# Patient Record
Sex: FEMALE | Race: WHITE | NOT HISPANIC OR LATINO | URBAN - METROPOLITAN AREA
[De-identification: names, ages, dates, MRNs, and addresses within clinical notes are randomized per-mention and may not be internally consistent; named-entity substitution may affect disease eponyms.]

---

## 2017-03-14 ENCOUNTER — OFFICE VISIT (OUTPATIENT)
Dept: MEDICAL GROUP | Age: 57
End: 2017-03-14
Payer: COMMERCIAL

## 2017-03-14 VITALS
HEART RATE: 100 BPM | WEIGHT: 224 LBS | BODY MASS INDEX: 35.16 KG/M2 | TEMPERATURE: 98.8 F | DIASTOLIC BLOOD PRESSURE: 76 MMHG | OXYGEN SATURATION: 96 % | SYSTOLIC BLOOD PRESSURE: 124 MMHG | HEIGHT: 67 IN

## 2017-03-14 DIAGNOSIS — Z12.31 ENCOUNTER FOR SCREENING MAMMOGRAM FOR BREAST CANCER: ICD-10-CM

## 2017-03-14 DIAGNOSIS — M79.602 MUSCULOSKELETAL PAIN OF LEFT UPPER EXTREMITY: ICD-10-CM

## 2017-03-14 DIAGNOSIS — Z11.59 NEED FOR HEPATITIS C SCREENING TEST: ICD-10-CM

## 2017-03-14 DIAGNOSIS — Z00.00 PE (PHYSICAL EXAM), ANNUAL: Primary | ICD-10-CM

## 2017-03-14 DIAGNOSIS — Z80.0 FHX: COLON CANCER: ICD-10-CM

## 2017-03-14 DIAGNOSIS — Z11.59 NEED FOR HEPATITIS B SCREENING TEST: ICD-10-CM

## 2017-03-14 DIAGNOSIS — E66.9 OBESITY (BMI 30-39.9): ICD-10-CM

## 2017-03-14 PROCEDURE — 99386 PREV VISIT NEW AGE 40-64: CPT | Performed by: FAMILY MEDICINE

## 2017-03-14 RX ORDER — NAPROXEN 500 MG/1
500 TABLET ORAL 2 TIMES DAILY WITH MEALS
Qty: 28 TAB | Refills: 0 | Status: SHIPPED | OUTPATIENT
Start: 2017-03-14 | End: 2017-04-11

## 2017-03-14 RX ORDER — CYCLOBENZAPRINE HCL 10 MG
10 TABLET ORAL
Qty: 30 TAB | Refills: 0 | Status: SHIPPED | OUTPATIENT
Start: 2017-03-14 | End: 2017-04-11

## 2017-03-14 ASSESSMENT — PATIENT HEALTH QUESTIONNAIRE - PHQ9: CLINICAL INTERPRETATION OF PHQ2 SCORE: 0

## 2017-03-14 ASSESSMENT — PAIN SCALES - GENERAL: PAINLEVEL: 7=MODERATE-SEVERE PAIN

## 2017-03-14 NOTE — MR AVS SNAPSHOT
"        Luisa Cerda   3/14/2017 3:20 PM   Office Visit   MRN: [id number]    Department:  [address] [place] [address]   Dept Phone:  [phone]    Description:  Female : 1960   Provider:  Glendy Rock M.D.           Reason for Visit     Establish Care right arm pain x 2 weeks pt states that it hurts more when she is moving it       Allergies as of 3/14/2017     No Known Allergies      You were diagnosed with     PE (physical exam), annual   [168075]       Obesity (BMI 30-39.9)   [638958]       Encounter for screening mammogram for breast cancer   [6889077]       Musculoskeletal pain of left upper extremity   [4429187]         Vital Signs     Blood Pressure Pulse Temperature Height Weight Body Mass Index    124/76 mmHg 100 37.1 °C (98.8 °F) 1.702 m (5' 7\") 101.606 kg (224 lb) 35.08 kg/m2    Oxygen Saturation Smoking Status                96% Never Smoker           Basic Information     Date Of Birth Sex Preferred Language          1960 Female English        Your appointments     2017  2:20 PM   Established Patient with Glendy Rock M.D.   [address])    [address] [place] [id number] [phone]           You will be receiving a confirmation call a few days before your appointment from our automated call confirmation system.              Problem List              ICD-10-CM Priority Class Noted - Resolved    Obesity (BMI 30-39.9) E66.9   3/14/2017 - Present      Health Maintenance        Date Due Completion Dates    IMM DTaP/Tdap/Td Vaccine (1 - Tdap) 1977 ---    PAP SMEAR 1979 ---    MAMMOGRAM 1998 ---    COLONOSCOPY 2008 ---    IMM INFLUENZA (1) 2016 ---            Current Immunizations     No immunizations on file.      Below and/or attached are the medications your provider expects you to take. Review all of your home medications and newly ordered medications with your provider and/or pharmacist. Follow medication instructions as directed " by your provider and/or pharmacist. Please keep your medication list with you and share with your provider. Update the information when medications are discontinued, doses are changed, or new medications (including over-the-counter products) are added; and carry medication information at all times in the event of emergency situations     Allergies:  No Known Allergies          Medications  Valid as of: March 14, 2017 -  3:42 PM    Generic Name Brand Name Tablet Size Instructions for use    Cyclobenzaprine HCl (Tab) FLEXERIL 10 MG Take 1 Tab by mouth every bedtime.        Naproxen (Tab) NAPROSYN 500 MG Take 1 Tab by mouth 2 times a day, with meals.        .                 Medicines prescribed today were sent to:     [facility] [address] [place] [address], [place] - [address] [facility]    [address] [facility] [address]    Phone: [phone] Fax: [phone]    Open 24 Hours?: No      Medication refill instructions:       If your prescription bottle indicates you have medication refills left, it is not necessary to call your provider’s office. Please contact your pharmacy and they will refill your medication.    If your prescription bottle indicates you do not have any refills left, you may request refills at any time through one of the following ways: The online [employer] system (except Urgent Care), by calling your provider’s office, or by asking your pharmacy to contact your provider’s office with a refill request. Medication refills are processed only during regular business hours and may not be available until the next business day. Your provider may request additional information or to have a follow-up visit with you prior to refilling your medication.   *Please Note: Medication refills are assigned a new Rx number when refilled electronically. Your pharmacy may indicate that no refills were authorized even though a new prescription for the same medication is available at the pharmacy. Please request the  medicine by name with the pharmacy before contacting your provider for a refill.        Your To Do List     Future Labs/Procedures Complete By Expires    COMP METABOLIC PANEL  As directed 3/15/2018    HEMOGLOBIN A1C  As directed 3/15/2018    HEP B CORE AB TOTAL  As directed 3/14/2018    HEP B SURFACE ANTIGEN  As directed 3/14/2018    HEP C VIRUS ANTIBODY  As directed 3/15/2018    LIPID PROFILE  As directed 3/15/2018    TSH WITH REFLEX TO FT4  As directed 3/14/2018    VITAMIN D,25 HYDROXY  As directed 3/15/2018      Referral     A referral request has been sent to our patient care coordination department. Please allow 3-5 business days for us to process this request and contact you either by phone or mail. If you do not hear from us by the 5th business day, please call us at [phone].           [employer] Access Code: [email or web address]  Expires: 4/7/2017  9:16 AM    [employer]  A secure, online tool to manage your health information     [employer]’s [employer]® is a secure, online tool that connects you to your personalized health information from the privacy of your home -- day or night - making it very easy for you to manage your healthcare. Once the activation process is completed, you can even access your medical information using the [employer] armani, which is available for free in the [employer] store or Google Play store.     [employer] provides the following levels of access (as shown below):   My Chart Features   [facility] Doctor [facility]  Specialists [facility]  Urgent  Care [facility]  Primary Care  Doctor   Email your healthcare team securely and privately 24/7 X X X    Manage appointments: schedule your next appointment; view details of past/upcoming appointments X      Request prescription refills. X      View recent personal medical records, including lab and immunizations X X X X   View health record, including health history, allergies, medications X X X X   Read reports about your outpatient visits,  procedures, consult and ER notes X X X X   See your discharge summary, which is a recap of your hospital and/or ER visit that includes your diagnosis, lab results, and care plan. X X       How to register for [employer]:  1. Go to  [email or web address].  2. Click on the Sign Up Now box, which takes you to the New Member Sign Up page. You will need to provide the following information:  a. Enter your [employer] Access Code exactly as it appears at the top of this page. (You will not need to use this code after you’ve completed the sign-up process. If you do not sign up before the expiration date, you must request a new code.)   b. Enter your date of birth.   c. Enter your home email address.   d. Click Submit, and follow the next screen’s instructions.  3. Create a TakeCharget ID. This will be your Take[employer] login ID and cannot be changed, so think of one that is secure and easy to remember.  4. Create a [employer] password. You can change your password at any time.  5. Enter your Password Reset Question and Answer. This can be used at a later time if you forget your password.   6. Enter your e-mail address. This allows you to receive e-mail notifications when new information is available in [employer].  7. Click Sign Up. You can now view your health information.    For assistance activating your [employer] account, call [phone]

## 2017-03-14 NOTE — Clinical Note
[facility]  Glendy Rock M.D.  [address] [place] [address]  Fax: [phone]   Authorization for Release/Disclosure of   Protected Health Information   Name: BRITTANY TERRY : 1960 SSN: [id number]   Address: [address] [place] [address]  [place] [address] Phone:    [phone] (home)    I authorize the entity listed below to release/disclose the PHI below to:   [facility]/Glendy Rock M.D. and Glendy Rock M.D.   Provider or Entity Name:     Address   City, State, [facility]   Phone:      Fax:     Reason for request: continuity of care   Information to be released:    [  ] LAST COLONOSCOPY,  including any PATH REPORT and follow-up  [  ] LAST FIT/COLOGUARD RESULT [  ] LAST DEXA  [  ] LAST MAMMOGRAM  [  ] LAST PAP  [  ] LAST LABS [  ] RETINA EXAM REPORT  [  ] IMMUNIZATION RECORDS  [  ] Release all info      [  ] Check here and initial the line next to each item to release ALL health information INCLUDING  _____ Care and treatment for drug and / or alcohol abuse  _____ HIV testing, infection status, or AIDS  _____ Genetic Testing    DATES OF SERVICE OR TIME PERIOD TO BE DISCLOSED: _____________  I understand and acknowledge that:  * This Authorization may be revoked at any time by you in writing, except if your health information has already been used or disclosed.  * Your health information that will be used or disclosed as a result of you signing this authorization could be re-disclosed by the recipient. If this occurs, your re-disclosed health information may no longer be protected by State or Federal laws.  * You may refuse to sign this Authorization. Your refusal will not affect your ability to obtain treatment.  * This Authorization becomes effective upon signing and will  on (date) __________.      If no date is indicated, this Authorization will  one (1) year from the signature date.    Name: Brittany Terry    Signature:   Date:     3/14/2017       PLEASE FAX REQUESTED RECORDS BACK TO: [phone]

## 2017-03-14 NOTE — PROGRESS NOTES
CC: establish care    HPI:     Luisa Cerda is a 56 y.o. female, new patient to the clinic, presents to establish care. Pt has the following concerns:     1. Obesity (BMI 30-39.9)  BMI 35.08, endorses sedentary lifestyle and no dietary modification.   Patient is interested in [employer] improvement program.    2. Musculoskeletal pain of left upper extremity  Pt develops acute right lateral arm pain couple days ago. She states that she was busy with relocation from [place] to [place] recently. She might have injury her right arm, but she does not remember how. She has two bruises at the left lateral arm. Pain is described as sharp, constant, non-radiating, worse with right arm abduction, lifting heavy objects. Denies problems with right shoulder. Pt has been applying heat, but symptoms fail to improve.    3. FHx: colon cancer  Mom, maternal uncle, and maternal grandmother was diagnosed with colon cancer in their 40s.   Patient herself had several colonoscopy in the past. Last colonoscopy was 2 years ago.  All were normal per patient report. It is recommended that she has repeat colonoscopy every 3 years.   Denies weight loss, hematochezia, melena, decreased stool caliber, abdominal pain, nausea, vomiting.    4. Need for hepatitis C, B screening test  Pt's   suddenly from liver failure and bone cancer last year. She was told that her  had liver failure, but does not know if he has liver cancer. Pt's  did not have hx of alcohol abuse. She does not know if he had hx of hepatitis. Pt wants to be screened for liver disease and hepatitis.       Current medicines (including changes today)  Current Outpatient Prescriptions   Medication Sig Dispense Refill   • naproxen (NAPROSYN) 500 MG Tab Take 1 Tab by mouth 2 times a day, with meals. 28 Tab 0   • cyclobenzaprine (FLEXERIL) 10 MG Tab Take 1 Tab by mouth every bedtime. 30 Tab 0     No current facility-administered medications for this visit.  "    She  has no past medical history on file.  She  has no past surgical history on file.  Social History   Substance Use Topics   • Smoking status: Never Smoker    • Smokeless tobacco: Never Used   • Alcohol Use: No     Social History     Social History Narrative   • No narrative on file     Family History   Problem Relation Age of Onset   • Hyperlipidemia Mother    • Cancer Father 40     colon cancer   • Cancer Paternal Grandmother 40     colon cancer   • Cancer Paternal Uncle 40     colon cancer     Family Status   Relation Status Death Age   • Mother  72     kidney failure   • Father Alive    • Sister Alive    • Brother Alive    • Maternal Grandmother     • Maternal Grandfather     • Paternal Grandmother     • Paternal Grandfather     • Brother Alive        I personally reviewed patient's problem list, allergies, medications, family hx, social hx with patient and update EPIC.     REVIEW OF SYSTEMS:  CONSTITUTIONAL:  Denies night sweats, fatigue, malaise, lethargy, fever or chills.  RESPIRATORY:  Denies cough, wheeze, hemoptysis, or shortness of breath.  CARDIOVASCULAR:  Denies chest pains, palpitations, pedal edema  GASTROINTESTINAL:  Denies abdominal pain, nausea or vomiting, diarrhea, constipation, hematemesis, hematochezia, melena.  GENITOURINARY:  Denies urinary urgency, frequency, dysuria, or hematuria.  No obstructive symptoms.  Denies unusual discharge.      All other systems reviewed and are negative     Objective:     Blood pressure 124/76, pulse 100, temperature 37.1 °C (98.8 °F), height 1.702 m (5' 7\"), weight 101.606 kg (224 lb), SpO2 96 %. Body mass index is 35.08 kg/(m^2).  Physical Exam:    Constitutional: Awake, alert, in no apparent distress, obese  Skin: Warm, dry, good turgor, no rashes/jaundice in visible areas.  Eye:  intact EOM, conjunctiva clear, lids normal.  Neck: Trachea midline, no masses, no thyromegaly. No cervical or supraclavicular " lymphadenopathy.  Respiratory: Unlabored respiratory effort, lungs clear to auscultation, no wheezes, no rhonchi.  Cardiovascular: Normal S1, S2, no murmur, no rubs, no gallops, no pedal edema.  Abdomen: Soft, active bowel sounds, non-tender to palpation, no masses, no hepatosplenomegaly.  Neuro: CN 2-12 grossly intact, no focal weakness/numbness.   Psych: Alert and oriented x3, affect and mood wnl, intact judgement and insight.   MSK: Moderate tenderness to palpation of the right lateral arm, along deltoid distribution. There are two small SQ hematoma on this same area.   Pain is worse with resisted abduction. Shoulder exam wnl.       Assessment and Plan:   The following treatment plan was discussed    1. PE (physical exam), annual  - COMP METABOLIC PANEL; Future  - HEMOGLOBIN A1C; Future  - LIPID PROFILE; Future  - VITAMIN D,25 HYDROXY; Future  - TSH WITH REFLEX TO FT4; Future      2. Obesity (BMI 30-39.9)  - Patient identified as having weight management issue.  Appropriate orders and counseling given.  - HEMOGLOBIN A1C; Future  - LIPID PROFILE; Future  - REFERRAL TO [facility] (HIP) Services Requested:: Weight Management Program, Medicare Obesity Counseling; Reason for Visit:: Overweight/Obesity      3. Encounter for screening mammogram for breast cancer  - MA-SCREEN MAMMO W/CAD-BILAT    4. Musculoskeletal pain of left upper extremity  Hx and exam consistent with deltoid injury. Plan:   - naproxen (NAPROSYN) 500 MG Tab; Take 1 Tab by mouth 2 times a day, with meals.  Dispense: 28 Tab; Refill: 0  - cyclobenzaprine (FLEXERIL) 10 MG Tab; Take 1 Tab by mouth every bedtime.  Dispense: 30 Tab; Refill: 0  - rest, avoid activities that can exacerbate the pain.     5. FHx: colon cancer  Mom, maternal uncle, and maternal grandmother was diagnosed with colon cancer in their 40s.   Patient herself had several colonoscopy in the past. Last colonoscopy was 2 years ago.  All were normal per patient  report. It is recommended that she has repeat colonoscopy every 3 years.   Denies weight loss, hematochezia, melena, decreased stool caliber, abdominal pain, nausea, vomiting.  Plan:   - f/u colonoscopy every 3 years    6. Need for hepatitis C, B screening test  Pt's   suddenly from liver failure and bone cancer last year. She was told that her  had liver failure, but does not know if he has liver cancer. Pt's  did not have hx of alcohol abuse. She does not know if he had hx of hepatitis. Pt wants to be screened for liver disease and hepatitis.  Plan:   - HEP B CORE AB TOTAL; Future  - HEP B SURFACE ANTIGEN; Future  - HEP B SURFACE AB  - HEP C VIRUS ANTIBODY; Future      Records requested.  Followup: Return in about 4 weeks (around 2017) for Pap.

## 2017-03-17 ENCOUNTER — HOSPITAL ENCOUNTER (OUTPATIENT)
Dept: LAB | Facility: MEDICAL CENTER | Age: 57
End: 2017-03-17
Attending: FAMILY MEDICINE
Payer: COMMERCIAL

## 2017-03-17 DIAGNOSIS — E66.9 OBESITY (BMI 30-39.9): ICD-10-CM

## 2017-03-17 DIAGNOSIS — Z00.00 PE (PHYSICAL EXAM), ANNUAL: ICD-10-CM

## 2017-03-17 LAB
25(OH)D3 SERPL-MCNC: 20 NG/ML (ref 30–100)
ALBUMIN SERPL BCP-MCNC: 3.8 G/DL (ref 3.2–4.9)
ALBUMIN/GLOB SERPL: 1.4 G/DL
ALP SERPL-CCNC: 66 U/L (ref 30–99)
ALT SERPL-CCNC: 17 U/L (ref 2–50)
ANION GAP SERPL CALC-SCNC: 10 MMOL/L (ref 0–11.9)
AST SERPL-CCNC: 19 U/L (ref 12–45)
BILIRUB SERPL-MCNC: 1 MG/DL (ref 0.1–1.5)
BUN SERPL-MCNC: 21 MG/DL (ref 8–22)
CALCIUM SERPL-MCNC: 9.1 MG/DL (ref 8.5–10.5)
CHLORIDE SERPL-SCNC: 105 MMOL/L (ref 96–112)
CHOLEST SERPL-MCNC: 207 MG/DL (ref 100–199)
CO2 SERPL-SCNC: 22 MMOL/L (ref 20–33)
CREAT SERPL-MCNC: 0.74 MG/DL (ref 0.5–1.4)
EST. AVERAGE GLUCOSE BLD GHB EST-MCNC: 105 MG/DL
GLOBULIN SER CALC-MCNC: 2.8 G/DL (ref 1.9–3.5)
GLUCOSE SERPL-MCNC: 86 MG/DL (ref 65–99)
HBA1C MFR BLD: 5.3 % (ref 0–5.6)
HBV CORE AB SERPL QL IA: NEGATIVE
HBV SURFACE AB SER RIA-ACNC: 3.42 MIU/ML (ref 0–10)
HBV SURFACE AG SERPL QL IA: NEGATIVE
HCV AB S/CO SERPL IA: NEGATIVE
HDLC SERPL-MCNC: 57 MG/DL
LDLC SERPL CALC-MCNC: 136 MG/DL
POTASSIUM SERPL-SCNC: 3.5 MMOL/L (ref 3.6–5.5)
PROT SERPL-MCNC: 6.6 G/DL (ref 6–8.2)
SODIUM SERPL-SCNC: 137 MMOL/L (ref 135–145)
TRIGL SERPL-MCNC: 68 MG/DL (ref 0–149)
TSH SERPL DL<=0.005 MIU/L-ACNC: 0.83 UIU/ML (ref 0.3–3.7)

## 2017-03-17 PROCEDURE — 80061 LIPID PANEL: CPT

## 2017-03-17 PROCEDURE — 84443 ASSAY THYROID STIM HORMONE: CPT

## 2017-03-17 PROCEDURE — 83036 HEMOGLOBIN GLYCOSYLATED A1C: CPT

## 2017-03-17 PROCEDURE — 86803 HEPATITIS C AB TEST: CPT

## 2017-03-17 PROCEDURE — 86706 HEP B SURFACE ANTIBODY: CPT

## 2017-03-17 PROCEDURE — 87340 HEPATITIS B SURFACE AG IA: CPT

## 2017-03-17 PROCEDURE — 36415 COLL VENOUS BLD VENIPUNCTURE: CPT

## 2017-03-17 PROCEDURE — 80053 COMPREHEN METABOLIC PANEL: CPT

## 2017-03-17 PROCEDURE — 82306 VITAMIN D 25 HYDROXY: CPT

## 2017-03-17 PROCEDURE — 86704 HEP B CORE ANTIBODY TOTAL: CPT

## 2017-03-20 PROBLEM — E78.00 PURE HYPERCHOLESTEROLEMIA: Status: ACTIVE | Noted: 2017-03-20

## 2017-03-20 PROBLEM — E55.9 VITAMIN D INSUFFICIENCY: Status: ACTIVE | Noted: 2017-03-20

## 2017-04-07 ENCOUNTER — PATIENT MESSAGE (OUTPATIENT)
Dept: FAMILY PLANNING/WOMEN'S HEALTH CLINIC | Facility: OTHER | Age: 57
End: 2017-04-07

## 2017-04-11 ENCOUNTER — HOSPITAL ENCOUNTER (OUTPATIENT)
Facility: MEDICAL CENTER | Age: 57
End: 2017-04-11
Attending: FAMILY MEDICINE
Payer: COMMERCIAL

## 2017-04-11 ENCOUNTER — OFFICE VISIT (OUTPATIENT)
Dept: MEDICAL GROUP | Age: 57
End: 2017-04-11
Payer: COMMERCIAL

## 2017-04-11 VITALS
HEIGHT: 67 IN | SYSTOLIC BLOOD PRESSURE: 122 MMHG | TEMPERATURE: 97.6 F | BODY MASS INDEX: 35 KG/M2 | OXYGEN SATURATION: 96 % | WEIGHT: 223 LBS | HEART RATE: 110 BPM | DIASTOLIC BLOOD PRESSURE: 80 MMHG

## 2017-04-11 DIAGNOSIS — Z01.419 PAP SMEAR, LOW-RISK: ICD-10-CM

## 2017-04-11 DIAGNOSIS — Z11.51 SPECIAL SCREENING EXAMINATION FOR HUMAN PAPILLOMAVIRUS (HPV): ICD-10-CM

## 2017-04-11 DIAGNOSIS — Z01.419 PAP SMEAR, LOW-RISK: Primary | ICD-10-CM

## 2017-04-11 DIAGNOSIS — Z12.4 ROUTINE CERVICAL SMEAR: ICD-10-CM

## 2017-04-11 PROBLEM — M79.602 MUSCULOSKELETAL PAIN OF LEFT UPPER EXTREMITY: Status: RESOLVED | Noted: 2017-03-14 | Resolved: 2017-04-11

## 2017-04-11 PROCEDURE — 88175 CYTOPATH C/V AUTO FLUID REDO: CPT

## 2017-04-11 PROCEDURE — 87624 HPV HI-RISK TYP POOLED RSLT: CPT

## 2017-04-11 PROCEDURE — 99396 PREV VISIT EST AGE 40-64: CPT | Performed by: FAMILY MEDICINE

## 2017-04-11 NOTE — PROGRESS NOTES
"Subjective:   CC: well woman exam    HPI:     Luisa Cerda is a 56 y.o. female, established patient of the clinic, presents with the following concerns:     , not sexually active   Contraception: pt is postmenopausal  Hx of STD: yes, Chlamydia when she was in her 20s, successfully treated  Hx of abnormal pap: none  LMP: 15 years ago   Denies fever, chills, pelvic pain, dyspareunia, abnormal vaginal bleeding/discharge, genital rash.     Denies nipple bleeding, discharge, breast mass, familial/personal hx of breast/gyn malignancy.     Current medicines (including changes today)  No current outpatient prescriptions on file.     No current facility-administered medications for this visit.     She  has no past medical history on file.    I personally reviewed patient's problem list, allergies, medications, family hx, social hx with patient and update EPIC.     REVIEW OF SYSTEMS:  CONSTITUTIONAL:  Denies night sweats, fatigue, malaise, lethargy, fever or chills.  RESPIRATORY:  Denies cough, wheeze, hemoptysis, or shortness of breath.  CARDIOVASCULAR:  Denies chest pains, palpitations, pedal edema  GASTROINTESTINAL:  Denies abdominal pain, nausea or vomiting, diarrhea, constipation, hematemesis, hematochezia, melena.  GENITOURINARY:  Denies urinary urgency, frequency, dysuria, or hematuria.       Objective:     Blood pressure 122/80, pulse 110, temperature 36.4 °C (97.6 °F), height 1.689 m (5' 6.5\"), weight 101.152 kg (223 lb), last menstrual period 2001, SpO2 96 %. Body mass index is 35.46 kg/(m^2).    Physical Exam:  Constitutional: awake, alert, in no distress.  Skin: Warm, dry, good turgor, no rashes, bruises, ulcers in visible areas.  Eye: PERRL, conjunctiva clear, lids neg for edema or lesions.  Neck: Trachea midline, no masses, no thyromegaly. No cervical or supraclavicular lymphadenopathy  Respiratory: Unlabored respiratory effort, lungs clear to auscultation, no wheezes, no rhonchi.  Cardiovascular: " Normal S1, S2, no murmur, no pedal edema.  Abdomen: Soft, non-tender to palpation, no hernia, no hepatosplenomegaly.  Neuro: CN2-12 grossly intact.    Psych: Oriented x3, affect and mood wnl, intact judgement and insight.   GYN: Unremarkable external genitalia. Negative abnormal vaginal discharge or bleeding, no vaginal rash, cervix in mid position, no concerning lesions on cervix, no cervical motion tenderness, uterus mid position with normal size & shape, no adnexal fullness/mass appreciated on bimanual exam.    Assessment and Plan:   The following treatment plan was discussed    1. Pap smear, low-risk  - THINPREP PAP WITH HPV; Future    2. Breast cancer screen:   - mammogram ordered, advised pt to schedule appointment for mammogram.     3. Colon cancer screen:   Done 2-3 years ago with [facility], will request records.     Glendy Rock M.D.      Followup: Return in about 6 months (around 10/11/2017) for Multiple issues.    Please note that this dictation was created using voice recognition software. I have made every reasonable attempt to correct obvious errors, but I expect that there are errors of grammar and possibly content that I did not discover before finalizing the note.

## 2017-04-11 NOTE — MR AVS SNAPSHOT
"        Luisa Cerda   2017 2:20 PM   Office Visit   MRN: [id number]    Department:  [address] [place] [address]   Dept Phone:  [phone]    Description:  Female : 1960   Provider:  Glendy Rock M.D.           Reason for Visit     Gynecologic Exam PAP      Allergies as of 2017     No Known Allergies      You were diagnosed with     Pap smear, low-risk   [292442]  -  Primary     Routine cervical smear   [[id number]]       Special screening examination for human papillomavirus (HPV)   [V73.81.ICD-9-CM]         Vital Signs     Blood Pressure Pulse Temperature Height Weight Body Mass Index    122/80 mmHg 110 36.4 °C (97.6 °F) 1.689 m (5' 6.5\") 101.152 kg (223 lb) 35.46 kg/m2    Oxygen Saturation Last Menstrual Period Smoking Status             96% 2001 Never Smoker          Basic Information     Date Of Birth Sex Race Ethnicity Preferred Language    1960 Female Unable to Obtain, White Unknown English      Your appointments     Oct 12, 2017  8:00 AM   Established Patient with Glendy Rock M.D.   [address] [id number] [phone]           You will be receiving a confirmation call a few days before your appointment from our automated call confirmation system.              Problem List              ICD-10-CM Priority Class Noted - Resolved    Obesity (BMI 30-39.9) E66.9   3/14/2017 - Present    FHx: colon cancer Z80.0   3/14/2017 - Present    Vitamin D insufficiency E55.9   3/20/2017 - Present    Pure hypercholesterolemia E78.00   3/20/2017 - Present      Health Maintenance        Date Due Completion Dates    IMM DTaP/Tdap/Td Vaccine (1 - Tdap) 1977 ---    PAP SMEAR 1979 ---    MAMMOGRAM 1998 ---    COLONOSCOPY 2008 ---            Current Immunizations     No immunizations on file.      Below and/or attached are the medications your provider expects you to take. Review all of your home medications and newly ordered " medications with your provider and/or pharmacist. Follow medication instructions as directed by your provider and/or pharmacist. Please keep your medication list with you and share with your provider. Update the information when medications are discontinued, doses are changed, or new medications (including over-the-counter products) are added; and carry medication information at all times in the event of emergency situations     Allergies:  No Known Allergies          Medications  Valid as of: April 11, 2017 -  3:00 PM    Generic Name Brand Name Tablet Size Instructions for use    .                 Medicines prescribed today were sent to:     [address], [place] [address] [facility]    [address] [facility] [place] [address]    Phone: [phone] Fax: [phone]    Open 24 Hours?: No      Medication refill instructions:       If your prescription bottle indicates you have medication refills left, it is not necessary to call your provider’s office. Please contact your pharmacy and they will refill your medication.    If your prescription bottle indicates you do not have any refills left, you may request refills at any time through one of the following ways: The online [facility] system (except Urgent Care), by calling your provider’s office, or by asking your pharmacy to contact your provider’s office with a refill request. Medication refills are processed only during regular business hours and may not be available until the next business day. Your provider may request additional information or to have a follow-up visit with you prior to refilling your medication.   *Please Note: Medication refills are assigned a new Rx number when refilled electronically. Your pharmacy may indicate that no refills were authorized even though a new prescription for the same medication is available at the pharmacy. Please request the medicine by name with the pharmacy before contacting your provider for a  refill.        Your To Do List     Future Labs/Procedures Complete By Expires    THINPREP PAP WITH HPV  As directed 4/12/2018         [employer] Access Code: Activation code not generated  Current [employer] Status: Active

## 2017-08-01 ENCOUNTER — HOSPITAL ENCOUNTER (OUTPATIENT)
Dept: RADIOLOGY | Facility: MEDICAL CENTER | Age: 57
End: 2017-08-01
Attending: FAMILY MEDICINE
Payer: COMMERCIAL

## 2017-08-01 PROCEDURE — G0202 SCR MAMMO BI INCL CAD: HCPCS

## 2017-08-08 DIAGNOSIS — R92.8 ABNORMAL MAMMOGRAM: ICD-10-CM

## 2017-09-05 ENCOUNTER — HOSPITAL ENCOUNTER (OUTPATIENT)
Dept: RADIOLOGY | Facility: MEDICAL CENTER | Age: 57
End: 2017-09-05
Attending: FAMILY MEDICINE
Payer: COMMERCIAL

## 2017-09-05 DIAGNOSIS — R92.8 ABNORMAL MAMMOGRAM: ICD-10-CM

## 2017-09-05 PROCEDURE — G0206 DX MAMMO INCL CAD UNI: HCPCS | Mod: LT

## 2017-09-05 PROCEDURE — 76642 ULTRASOUND BREAST LIMITED: CPT | Mod: LT

## 2017-09-08 DIAGNOSIS — R92.8 ABNORMAL MAMMOGRAM: ICD-10-CM

## 2017-09-22 ENCOUNTER — OFFICE VISIT (OUTPATIENT)
Dept: MEDICAL GROUP | Age: 57
End: 2017-09-22
Payer: COMMERCIAL

## 2017-09-22 VITALS
SYSTOLIC BLOOD PRESSURE: 132 MMHG | DIASTOLIC BLOOD PRESSURE: 62 MMHG | HEART RATE: 81 BPM | TEMPERATURE: 97.8 F | OXYGEN SATURATION: 95 % | BODY MASS INDEX: 35.16 KG/M2 | HEIGHT: 67 IN | WEIGHT: 224 LBS | RESPIRATION RATE: 16 BRPM

## 2017-09-22 DIAGNOSIS — Z23 FLU VACCINE NEED: ICD-10-CM

## 2017-09-22 DIAGNOSIS — Z12.11 COLON CANCER SCREENING: ICD-10-CM

## 2017-09-22 DIAGNOSIS — E66.9 OBESITY (BMI 30-39.9): ICD-10-CM

## 2017-09-22 DIAGNOSIS — E78.00 PURE HYPERCHOLESTEROLEMIA: Primary | ICD-10-CM

## 2017-09-22 DIAGNOSIS — Z00.00 PE (PHYSICAL EXAM), ANNUAL: ICD-10-CM

## 2017-09-22 DIAGNOSIS — E55.9 VITAMIN D INSUFFICIENCY: ICD-10-CM

## 2017-09-22 DIAGNOSIS — Z23 NEED FOR TDAP VACCINATION: ICD-10-CM

## 2017-09-22 PROCEDURE — 90686 IIV4 VACC NO PRSV 0.5 ML IM: CPT | Performed by: FAMILY MEDICINE

## 2017-09-22 PROCEDURE — 90471 IMMUNIZATION ADMIN: CPT | Performed by: FAMILY MEDICINE

## 2017-09-22 PROCEDURE — 90472 IMMUNIZATION ADMIN EACH ADD: CPT | Performed by: FAMILY MEDICINE

## 2017-09-22 PROCEDURE — 99214 OFFICE O/P EST MOD 30 MIN: CPT | Mod: 25 | Performed by: FAMILY MEDICINE

## 2017-09-22 PROCEDURE — 90715 TDAP VACCINE 7 YRS/> IM: CPT | Performed by: FAMILY MEDICINE

## 2017-09-22 RX ORDER — MULTIVIT-MIN/IRON/FOLIC ACID/K 18-600-40
CAPSULE ORAL
COMMUNITY
End: 2018-04-29

## 2017-09-23 NOTE — PROGRESS NOTES
"Subjective:   CC: obesity and HLD f/u     HPI:     Luisa Cerda is a 56 y.o. female, established patient of the clinic, presents with the following concerns:     1. Pure hypercholesterolemia  Chronic, currently working on dietary modification, exercise, weight loss.  Patient has not noticed any significant changes in her weight despite dietary modification.  She is active but no regular exercise.    2. Vitamin D insufficiency  Patient is taking vitamin D supplement daily.    3. Obesity (BMI 30-39.9)  BMI 35.61, stable, no significant changes over the past 6 months despite dietary modification.  Patient is active but no regular exercise. Patient was referred to [facility] 6 months ago.  However she was not mentally ready at the time. Today, she states that she feels ready to take the next step. She is motivated to lose weight.  She is interested in another referral to [facility].    Current medicines (including changes today)  Current Outpatient Prescriptions   Medication Sig Dispense Refill   • Cholecalciferol (VITAMIN D) 2000 units Cap Take  by mouth.     • Multiple Vitamins-Minerals (MULTIVITAMIN WOMEN 50+ PO) Take  by mouth.       No current facility-administered medications for this visit.      She  has no past medical history on file.    I personally reviewed patient's problem list, allergies, medications, family hx, social hx with patient and update EPIC.     REVIEW OF SYSTEMS:  CONSTITUTIONAL:  Denies night sweats, fatigue, malaise, lethargy, fever or chills.  RESPIRATORY:  Denies cough, wheeze, hemoptysis, or shortness of breath.  CARDIOVASCULAR:  Denies chest pains, palpitations, pedal edema     Objective:     Blood pressure 132/62, pulse 81, temperature 36.6 °C (97.8 °F), resp. rate 16, height 1.689 m (5' 6.5\"), weight 101.6 kg (224 lb), SpO2 95 %. Body mass index is 35.61 kg/m².    Physical Exam:  Constitutional: awake, alert, in no distress, obese  Skin: " Warm, dry, good turgor, no rashes, bruises, ulcers in visible areas.  Eye: conjunctiva clear, lids neg for edema or lesions.  Respiratory: Unlabored respiratory effort, lungs clear to auscultation, no wheezes, no rhonchi.  Cardiovascular: Normal S1, S2, no murmur, no pedal edema.  Abdomen: Soft, non-tender to palpation, no hernia, no hepatosplenomegaly.  Neuro: CN2-12 grossly intact.  Psych: Oriented x3, affect and mood wnl, intact judgement and insight.       Assessment and Plan:   The following treatment plan was discussed    1. Pure hypercholesterolemia  Chronic, diet control, doing well. Plan:  - Continue dietary modification, exercise, weight loss.  - Referral to [facility] improvement program  - Repeat lipid panel in 6 months    2. Vitamin D insufficiency  Continue vitamin D supplement, 2000 units per day. Repeat vitamin D in 6 months.    3. Obesity (BMI 30-39.9)  - Patient identified as having weight management issue.  Appropriate orders and counseling given.  - REFERRAL TO [facility] IMPROVEMENT PROGRAMS (HIP) Services Requested: Weight Management Program; Reason for Visit: Overweight/Obesity; Future    4. Colon cancer screening  - REFERRAL TO GI FOR COLONOSCOPY    5. Flu vaccine need  - INFLUENZA VACCINE QUAD INJ >3Y(PF)    6. Need for Tdap vaccination  - TDAP VACCINE =>8YO IM    Glendy Rock M.D.      Followup: Return in about 1 year (around 9/22/2018) for Annual wellness visit, Short.    Please note that this dictation was created using voice recognition software. I have made every reasonable attempt to correct obvious errors, but I expect that there are errors of grammar and possibly content that I did not discover before finalizing the note.

## 2017-11-03 ENCOUNTER — OFFICE VISIT (OUTPATIENT)
Dept: MEDICAL GROUP | Facility: PHYSICIAN GROUP | Age: 57
End: 2017-11-03
Payer: COMMERCIAL

## 2017-11-03 VITALS
BODY MASS INDEX: 34.53 KG/M2 | HEIGHT: 67 IN | WEIGHT: 220 LBS | HEART RATE: 80 BPM | OXYGEN SATURATION: 96 % | RESPIRATION RATE: 16 BRPM | TEMPERATURE: 97.6 F | DIASTOLIC BLOOD PRESSURE: 90 MMHG | SYSTOLIC BLOOD PRESSURE: 126 MMHG

## 2017-11-03 DIAGNOSIS — M25.511 CHRONIC PAIN OF BOTH SHOULDERS: ICD-10-CM

## 2017-11-03 DIAGNOSIS — M25.511 CHRONIC RIGHT SHOULDER PAIN: ICD-10-CM

## 2017-11-03 DIAGNOSIS — M25.512 CHRONIC PAIN OF BOTH SHOULDERS: ICD-10-CM

## 2017-11-03 DIAGNOSIS — G89.29 CHRONIC RIGHT SHOULDER PAIN: ICD-10-CM

## 2017-11-03 DIAGNOSIS — G89.29 CHRONIC PAIN OF BOTH SHOULDERS: ICD-10-CM

## 2017-11-03 PROCEDURE — 99214 OFFICE O/P EST MOD 30 MIN: CPT | Performed by: NURSE PRACTITIONER

## 2017-11-03 RX ORDER — CYCLOBENZAPRINE HCL 10 MG
10 TABLET ORAL 3 TIMES DAILY PRN
Qty: 45 TAB | Refills: 1 | Status: SHIPPED | OUTPATIENT
Start: 2017-11-03 | End: 2018-04-29

## 2017-11-03 RX ORDER — NAPROXEN 500 MG/1
500 TABLET ORAL 2 TIMES DAILY WITH MEALS
Qty: 60 TAB | Refills: 3 | Status: SHIPPED | OUTPATIENT
Start: 2017-11-03 | End: 2018-04-29

## 2017-11-03 NOTE — ASSESSMENT & PLAN NOTE
Patient presents today for ongoing right shoulder pain. She does not recall any specific injury but this has been going on for several months. She was also seen earlier this year for chronic left shoulder pain which did improve but now the pain is in her right shoulder as well. She has mildly limited range of motion with forward flexion. She does not have significant tenderness with palpation of the biceps tendon or muscles that make up the rotator cuff. I did discuss differential diagnoses with patient including rotator cuff injury, biceps tendinitis, bursitis, musculoskeletal strain versus other unknown causes.

## 2017-11-03 NOTE — PROGRESS NOTES
Chief Complaint   Patient presents with   • Arm Pain     R arm pain          This is a 57 y.o.female patient that presents today with the following:Right shoulder pain    Right shoulder pain  Patient presents today for ongoing right shoulder pain. She does not recall any specific injury but this has been going on for several months. She was also seen earlier this year for chronic left shoulder pain which did improve but now the pain is in her right shoulder as well. She has mildly limited range of motion with forward flexion. She does not have significant tenderness with palpation of the biceps tendon or muscles that make up the rotator cuff. I did discuss differential diagnoses with patient including rotator cuff injury, biceps tendinitis, bursitis, musculoskeletal strain versus other unknown causes.      No visits with results within 1 Month(s) from this visit.   Latest known visit with results is:   Hospital Outpatient Visit on 03/17/2017   Component Date Value   • Sodium 03/17/2017 137    • Potassium 03/17/2017 3.5*   • Chloride 03/17/2017 105    • Co2 03/17/2017 22    • Anion Gap 03/17/2017 10.0    • Glucose 03/17/2017 86    • Bun 03/17/2017 21    • Creatinine 03/17/2017 0.74    • Calcium 03/17/2017 9.1    • AST(SGOT) 03/17/2017 19    • ALT(SGPT) 03/17/2017 17    • Alkaline Phosphatase 03/17/2017 66    • Total Bilirubin 03/17/2017 1.0    • Albumin 03/17/2017 3.8    • Total Protein 03/17/2017 6.6    • Globulin 03/17/2017 2.8    • A-G Ratio 03/17/2017 1.4    • Glycohemoglobin 03/17/2017 5.3    • Est Avg Glucose 03/17/2017 105    • Cholesterol,Tot 03/17/2017 207*   • Triglycerides 03/17/2017 68    • HDL 03/17/2017 57    • LDL 03/17/2017 136*   • 25-Hydroxy   Vitamin D 25 03/17/2017 20*   • TSH 03/17/2017 0.830    • Hepatitis B Ccore Ab, To* 03/17/2017 Negative    • Hepatitis B Surface Anti* 03/17/2017 Negative    • Hepatitis C Antibody 03/17/2017 Negative    • Hep B Surface Antibody Q* 03/17/2017 3.42    • GFR If  " 03/17/2017 >60    • GFR If Non  Ameri* 03/17/2017 >60                History reviewed. No pertinent past medical history.    History reviewed. No pertinent surgical history.    Family History   Problem Relation Age of Onset   • Hyperlipidemia Mother    • Cancer Father 40     colon cancer   • Cancer Paternal Grandmother 40     colon cancer   • Cancer Paternal Uncle 40     colon cancer       Review of patient's allergies indicates no known allergies.    Current Outpatient Prescriptions Ordered in [facility]   Medication Sig Dispense Refill   • Aspirin-Caffeine (SHANTI BACK & BODY PAIN EX ST PO) Take  by mouth.     • naproxen (NAPROSYN) 500 MG Tab Take 1 Tab by mouth 2 times a day, with meals. 60 Tab 3   • cyclobenzaprine (FLEXERIL) 10 MG Tab Take 1 Tab by mouth 3 times a day as needed. 45 Tab 1   • Cholecalciferol (VITAMIN D) 2000 units Cap Take  by mouth.     • Multiple Vitamins-Minerals (MULTIVITAMIN WOMEN 50+ PO) Take  by mouth.       No current Epic-ordered facility-administered medications on file.        Constitutional ROS: No unexpected change in weight, No weakness, No unexplained fevers, sweats, or chills  Cardiovascular ROS: No chest pain, No edema, No palpitations  Musculoskeletal/Extremities ROS: Positive per history of present illness  Neurologic ROS: Normal development, No seizures, No weakness    Physical exam:  /90   Pulse 80   Temp 36.4 °C (97.6 °F)   Resp 16   Ht 1.689 m (5' 6.5\")   Wt 99.8 kg (220 lb)   SpO2 96%   BMI 34.98 kg/m²   General Appearance: Middle-aged older female, alert, no distress, obese, well-groomed  Skin: Skin color, texture, turgor normal. No rashes or lesions.  Lungs: negative findings: normal respiratory rate and rhythm, normal effort  Extremities: positive findings: Mild tenderness over right bicipital tendon, mild limited range of motion with forward flexion of right upper extremity  Musculoskeletal: negative findings: no evidence of joint " instability, strength normal, no deformities present  Neurologic: intact, oriented, mood appropriate, judgment intact. Cranial nerves II-12 grossly intact    Medical decision making/discussion: I have encouraged patient to continue with gentle range of motion exercises and stretches of the right upper extremity. Naproxen and Flexeril refilled, she is to take these as prescribed. I did discuss with her the risks and the benefits and side effects of medication and warned her not to drive taking Flexeril sedating effects. She is willing to see physical therapy for further evaluation and treatment, referral has been placed.    Luisa was seen today for arm pain.    Diagnoses and all orders for this visit:    Chronic right shoulder pain  -     naproxen (NAPROSYN) 500 MG Tab; Take 1 Tab by mouth 2 times a day, with meals.  -     cyclobenzaprine (FLEXERIL) 10 MG Tab; Take 1 Tab by mouth 3 times a day as needed.  -     REFERRAL TO PHYSICAL THERAPY Reason for Therapy: Eval/Treat/Report    Chronic pain of both shoulders  -     naproxen (NAPROSYN) 500 MG Tab; Take 1 Tab by mouth 2 times a day, with meals.  -     cyclobenzaprine (FLEXERIL) 10 MG Tab; Take 1 Tab by mouth 3 times a day as needed.  -     REFERRAL TO PHYSICAL THERAPY Reason for Therapy: Eval/Treat/Report          Please note that this dictation was created using voice recognition software. I have made every reasonable attempt to correct obvious errors, but I expect that there are errors of grammar and possibly content that I did not discover before finalizing the note.

## 2018-04-29 ENCOUNTER — OFFICE VISIT (OUTPATIENT)
Dept: URGENT CARE | Facility: PHYSICIAN GROUP | Age: 58
End: 2018-04-29
Payer: COMMERCIAL

## 2018-04-29 VITALS
HEART RATE: 79 BPM | HEIGHT: 68 IN | WEIGHT: 221 LBS | RESPIRATION RATE: 16 BRPM | BODY MASS INDEX: 33.49 KG/M2 | TEMPERATURE: 98.1 F | SYSTOLIC BLOOD PRESSURE: 124 MMHG | DIASTOLIC BLOOD PRESSURE: 88 MMHG | OXYGEN SATURATION: 97 %

## 2018-04-29 DIAGNOSIS — K08.89 PAIN, DENTAL: ICD-10-CM

## 2018-04-29 DIAGNOSIS — R22.0 LEFT FACIAL SWELLING: ICD-10-CM

## 2018-04-29 PROCEDURE — 99214 OFFICE O/P EST MOD 30 MIN: CPT | Performed by: PHYSICIAN ASSISTANT

## 2018-04-29 RX ORDER — AMOXICILLIN 875 MG/1
875 TABLET, COATED ORAL 2 TIMES DAILY
Qty: 20 TAB | Refills: 0 | Status: SHIPPED | OUTPATIENT
Start: 2018-04-29 | End: 2021-07-09

## 2018-04-29 RX ORDER — IBUPROFEN 800 MG/1
800 TABLET ORAL EVERY 8 HOURS PRN
Qty: 60 TAB | Refills: 0 | Status: SHIPPED | OUTPATIENT
Start: 2018-04-29 | End: 2021-07-09

## 2018-04-29 ASSESSMENT — PAIN SCALES - GENERAL: PAINLEVEL: 8=MODERATE-SEVERE PAIN

## 2018-10-22 ENCOUNTER — HOSPITAL ENCOUNTER (OUTPATIENT)
Dept: RADIOLOGY | Facility: MEDICAL CENTER | Age: 58
End: 2018-10-22
Attending: PHYSICIAN ASSISTANT
Payer: COMMERCIAL

## 2018-10-22 ENCOUNTER — OFFICE VISIT (OUTPATIENT)
Dept: URGENT CARE | Facility: PHYSICIAN GROUP | Age: 58
End: 2018-10-22
Payer: COMMERCIAL

## 2018-10-22 VITALS
OXYGEN SATURATION: 96 % | HEART RATE: 82 BPM | RESPIRATION RATE: 14 BRPM | SYSTOLIC BLOOD PRESSURE: 122 MMHG | TEMPERATURE: 97.8 F | DIASTOLIC BLOOD PRESSURE: 90 MMHG | BODY MASS INDEX: 33.65 KG/M2 | WEIGHT: 222 LBS | HEIGHT: 68 IN

## 2018-10-22 DIAGNOSIS — R10.11 RUQ PAIN: ICD-10-CM

## 2018-10-22 LAB
APPEARANCE UR: CLEAR
BILIRUB UR STRIP-MCNC: NORMAL MG/DL
COLOR UR AUTO: YELLOW
GLUCOSE UR STRIP.AUTO-MCNC: NORMAL MG/DL
KETONES UR STRIP.AUTO-MCNC: NORMAL MG/DL
LEUKOCYTE ESTERASE UR QL STRIP.AUTO: NORMAL
NITRITE UR QL STRIP.AUTO: NORMAL
PH UR STRIP.AUTO: 6 [PH] (ref 5–8)
PROT UR QL STRIP: NORMAL MG/DL
RBC UR QL AUTO: NORMAL
SP GR UR STRIP.AUTO: 1.02
UROBILINOGEN UR STRIP-MCNC: 0.2 MG/DL

## 2018-10-22 PROCEDURE — 81002 URINALYSIS NONAUTO W/O SCOPE: CPT | Performed by: PHYSICIAN ASSISTANT

## 2018-10-22 PROCEDURE — 76705 ECHO EXAM OF ABDOMEN: CPT

## 2018-10-22 PROCEDURE — 99214 OFFICE O/P EST MOD 30 MIN: CPT | Performed by: PHYSICIAN ASSISTANT

## 2018-10-22 ASSESSMENT — PAIN SCALES - GENERAL: PAINLEVEL: NO PAIN

## 2018-10-22 NOTE — PROGRESS NOTES
Chief Complaint   Patient presents with   • Side Pain     R side shooting pain x2d/ off and on pain       HISTORY OF PRESENT ILLNESS: Patient is a 58 y.o. female who presents today for the following:    RUQ pain x 10/21, early in the morning while sleeping  Pain again while at rest today  NOT worse with pain  Occasionally worse with breathing  Feels slightly SOB  Denies fever, N/V, body aches, chills, urinary/stool changes  No history of abdominal surgery    Patient Active Problem List    Diagnosis Date Noted   • Right shoulder pain 2017   • Vitamin D insufficiency 2017   • Pure hypercholesterolemia 2017   • Obesity (BMI 30-39.9) 2017   • FHx: colon cancer 2017       Allergies:Patient has no known allergies.    Current Outpatient Prescriptions Ordered in [facility]   Medication Sig Dispense Refill   • amoxicillin (AMOXIL) 875 MG tablet Take 1 Tab by mouth 2 times a day. (Patient not taking: Reported on 10/22/2018) 20 Tab 0   • ibuprofen (MOTRIN) 800 MG Tab Take 1 Tab by mouth every 8 hours as needed for Moderate Pain. (Patient not taking: Reported on 10/22/2018) 60 Tab 0     No current Epic-ordered facility-administered medications on file.        No past medical history on file.    Social History   Substance Use Topics   • Smoking status: Never Smoker   • Smokeless tobacco: Never Used   • Alcohol use No       Family Status   Relation Status   • Mo  at age 72        kidney failure   • Fa Alive   • Sis Alive   • Bro Alive   • MGMo    • MGFa    • PGMo    • PGFa    • Bro Alive   • PUnc (Not Specified)     Family History   Problem Relation Age of Onset   • Hyperlipidemia Mother    • Cancer Father 40        colon cancer   • Cancer Paternal Grandmother 40        colon cancer   • Cancer Paternal Uncle 40        colon cancer       Review of Systems:  Constitutional ROS: No unexpected change in weight, No weakness, No fatigue  Eye ROS: No recent significant  "change in vision, No eye pain, redness, discharge  Ear ROS: No drainage, No tinnitus or vertigo, No recent change in hearing  Mouth/Throat ROS: No teeth or gum problems, No bleeding gums, No tongue complaints  Neck ROS: No swollen glands, No significant pain in neck  Pulmonary ROS: No chronic cough, sputum, or hemoptysis, No dyspnea on exertion, No wheezing  Cardiovascular ROS: No diaphoresis, No edema, No palpitations  Musculoskeletal/Extremities ROS: No peripheral edema, No pain, redness or swelling on the joints  Hematologic/Lymphatic ROS: No chills, No night sweats, No weight loss  Skin/Integumentary ROS: No edema, No evidence of rash, No itching      Exam:  Blood pressure 122/90, pulse 82, temperature 36.6 °C (97.8 °F), temperature source Temporal, resp. rate 14, height 1.727 m (5' 8\"), weight 100.7 kg (222 lb), SpO2 96 %.  General: Well developed, well nourished. No distress.  Pulmonary: Unlabored respiratory effort. Lungs clear to auscultation, no wheezes, no rhonchi.  Cardiovascular: Regular rate and rhythm without murmur.     Abdomen: Soft, nondistended. No hepatosplenomegaly.  Bowel sounds within normal limits.  Right upper quadrant tenderness with mild voluntary guarding.  No rebound noted.  Neurologic: Grossly nonfocal. No facial asymmetry noted.  Skin: Warm, dry, good turgor. No rashes in visible areas.   Psych: Normal mood. Alert and oriented x3. Judgment and insight is normal.    UA: Negative    Assessment/Plan:  Patient has been scheduled for an outpatient ultrasound at 1730 hrs. in [place].  Will contact patient with results.  1. RUQ pain  US-RUQ     19:00  [phone] (home)   Left a message on patient's voicemail regarding negative results.  Follow-up for worsening or persistent symptoms.  Impression       1.  Unremarkable right upper quadrant ultrasound.       "

## 2021-07-07 ENCOUNTER — TELEPHONE (OUTPATIENT)
Dept: SCHEDULING | Facility: IMAGING CENTER | Age: 61
End: 2021-07-07

## 2021-07-08 NOTE — PROGRESS NOTES
NEW PATIENT VISIT PRE-VISIT PLANNING    1st attempt:Thursday, 07/08/2021@9:03AM:  Phone Number Called: [phone] (home)   Call outcome: No answer. Left Voice Mail.  Message: Advised office visit scheduled with Dr. Rock, Friday, 07/09/2021@9:00AM, [address]. Request call back. Need to complete Pre-Visit Planning.    2nd attempt: Thursday, 07/08/2021@2:02PM  Phone Number Called: [phone] (home)   Call outcome: No answer. Left Voice Mail.  Message: Re-advised OV sched with , Fri. 7/9/21@9:00AM, [address] Request call back. Need to complete Pre-Visit Planning.    1.  [employer] Patient is checked in Patient Demographics?Yes    2.  Immunizations were updated in Epic using Reconcile Outside Information activity? Yes         3.  Is this appointment scheduled as a Hospital Follow-Up? No    4.  Patient is due for the following Health Maintenance Topics:   Health Maintenance Due   Topic Date Due   • COVID-19 Vaccine (1) Never done   • COLONOSCOPY  Never done   • IMM ZOSTER VACCINES (1 of 2) Never done   • MAMMOGRAM  09/05/2018   • PAP SMEAR  04/11/2020     5.  Reviewed/Updated the following with patient:       •   Preferred Pharmacy? Yes       •   Preferred Lab? Yes       •   Preferred Communication? Yes       •   Allergies? Yes       •   Medications? Yes       •   Social History? Yes       •   Family History (document living status of immediate family members and if + hx of  cancer, diabetes, hypertension, hyperlipidemia, heart attack, stroke)Yes    6.  Updated Care Team?       •   DME Company (gait device, O2, CPAP, etc.) None, per patient       •   Other Specialists (eye doctor, derm, GYN, cardiology, endo, etc):   [facility]    7.  AHA (Puls8) form printed for Provider? N/A

## 2021-07-09 ENCOUNTER — HOSPITAL ENCOUNTER (OUTPATIENT)
Dept: LAB | Facility: MEDICAL CENTER | Age: 61
End: 2021-07-09
Attending: FAMILY MEDICINE
Payer: COMMERCIAL

## 2021-07-09 ENCOUNTER — OFFICE VISIT (OUTPATIENT)
Dept: MEDICAL GROUP | Age: 61
End: 2021-07-09
Payer: COMMERCIAL

## 2021-07-09 ENCOUNTER — HOSPITAL ENCOUNTER (OUTPATIENT)
Dept: RADIOLOGY | Facility: MEDICAL CENTER | Age: 61
End: 2021-07-09
Attending: FAMILY MEDICINE
Payer: COMMERCIAL

## 2021-07-09 VITALS
HEIGHT: 68 IN | DIASTOLIC BLOOD PRESSURE: 90 MMHG | HEART RATE: 68 BPM | TEMPERATURE: 97.1 F | OXYGEN SATURATION: 96 % | SYSTOLIC BLOOD PRESSURE: 126 MMHG | WEIGHT: 236 LBS | BODY MASS INDEX: 35.77 KG/M2

## 2021-07-09 DIAGNOSIS — E66.9 OBESITY (BMI 30-39.9): ICD-10-CM

## 2021-07-09 DIAGNOSIS — Z12.11 SCREENING FOR COLORECTAL CANCER: ICD-10-CM

## 2021-07-09 DIAGNOSIS — E55.9 VITAMIN D INSUFFICIENCY: ICD-10-CM

## 2021-07-09 DIAGNOSIS — Z23 NEED FOR VACCINATION: ICD-10-CM

## 2021-07-09 DIAGNOSIS — Z12.31 ENCOUNTER FOR SCREENING MAMMOGRAM FOR BREAST CANCER: ICD-10-CM

## 2021-07-09 DIAGNOSIS — E78.00 PURE HYPERCHOLESTEROLEMIA: ICD-10-CM

## 2021-07-09 DIAGNOSIS — Z00.00 PE (PHYSICAL EXAM), ANNUAL: ICD-10-CM

## 2021-07-09 DIAGNOSIS — Z12.12 SCREENING FOR COLORECTAL CANCER: ICD-10-CM

## 2021-07-09 DIAGNOSIS — Z78.0 MENOPAUSE: ICD-10-CM

## 2021-07-09 DIAGNOSIS — M17.12 PRIMARY OSTEOARTHRITIS OF LEFT KNEE: ICD-10-CM

## 2021-07-09 DIAGNOSIS — Z00.00 PE (PHYSICAL EXAM), ANNUAL: Primary | ICD-10-CM

## 2021-07-09 PROBLEM — M25.511 RIGHT SHOULDER PAIN: Status: RESOLVED | Noted: 2017-11-03 | Resolved: 2021-07-09

## 2021-07-09 PROCEDURE — 80061 LIPID PANEL: CPT

## 2021-07-09 PROCEDURE — 80053 COMPREHEN METABOLIC PANEL: CPT

## 2021-07-09 PROCEDURE — 82306 VITAMIN D 25 HYDROXY: CPT

## 2021-07-09 PROCEDURE — 90750 HZV VACC RECOMBINANT IM: CPT | Performed by: FAMILY MEDICINE

## 2021-07-09 PROCEDURE — 85025 COMPLETE CBC W/AUTO DIFF WBC: CPT

## 2021-07-09 PROCEDURE — 99204 OFFICE O/P NEW MOD 45 MIN: CPT | Mod: 25 | Performed by: FAMILY MEDICINE

## 2021-07-09 PROCEDURE — 36415 COLL VENOUS BLD VENIPUNCTURE: CPT

## 2021-07-09 PROCEDURE — 90471 IMMUNIZATION ADMIN: CPT | Performed by: FAMILY MEDICINE

## 2021-07-09 PROCEDURE — 73562 X-RAY EXAM OF KNEE 3: CPT | Mod: LT

## 2021-07-09 PROCEDURE — 83036 HEMOGLOBIN GLYCOSYLATED A1C: CPT

## 2021-07-09 ASSESSMENT — PATIENT HEALTH QUESTIONNAIRE - PHQ9: CLINICAL INTERPRETATION OF PHQ2 SCORE: 0

## 2021-07-09 NOTE — PROGRESS NOTES
Subjective:   CC: re-establish care     HPI:     Luisa Cerda is a 60 y.o. female, established patient of the clinic, presents with the following concerns:     Patient is overall healthy, no major medical or surgical history.  She currently does not take any medication.  Negative history of drugs, alcohol, tobacco abuse.  Patient is in menopause, she is not sexually active.  She has 2 adult children.  Patient is currently employed full-time, but she is considering [employer].  She previously has hyperlipidemia, vitamin D insufficiency.  However, she is not taking medication for hyperlipidemia.  She also does not take vitamin D supplement.    Patient has familial history colon cancer in multiple first and second-degree family member.  She is due to have repeat colonoscopy.  She denies any active GI symptoms, unexplained weight loss.  She is also due for Pap smear and mammogram.  She is open to receive shingles vaccine today.    She complains of intermittent popping sensation of the left knee with mild discomfort with position changes, usually from sitting to standing.  Negative knee swelling, erythema, persistent pain.  Negative history of trauma or previous surgery of the left knee.    Current medicines (including changes today)  No current outpatient medications on file.     No current facility-administered medications for this visit.     She  has no past medical history on file.    I personally reviewed patient's problem list, allergies, medications, family hx, social hx with patient and update EPIC.     REVIEW OF SYSTEMS:  CONSTITUTIONAL:  Denies night sweats, fatigue, malaise, lethargy, fever or chills.  RESPIRATORY:  Denies cough, wheeze, hemoptysis, or shortness of breath.  CARDIOVASCULAR:  Denies chest pains, palpitations, pedal edema     Objective:     /90 (BP Location: Right arm, Patient Position: Sitting, BP Cuff Size: Adult long)   Pulse 68   Temp 36.2 °C (97.1 °F) (Temporal)   Ht 1.727 m (5'  "8\")   Wt 107 kg (236 lb)   SpO2 96%  Body mass index is 35.88 kg/m².    Physical Exam:  Constitutional: awake, alert, in no distress.  Skin: Warm, dry, good turgor, no rashes, bruises, ulcers in visible areas.  Eye: conjunctiva clear, lids neg for edema or lesions.  ENMT: TM and auditory canals wnl.    Neck: Trachea midline, no masses, no thyromegaly. No cervical or supraclavicular lymphadenopathy  Respiratory: Unlabored respiratory effort, lungs clear to auscultation, no wheezes, no rales.  Cardiovascular: Normal S1, S2, no murmur, no pedal edema.  Psych: Oriented x3, affect and mood wnl, intact judgement and insight.   MSK: unremarkable knee exam.     Assessment and Plan:   The following treatment plan was discussed    1. Pure hypercholesterolemia  - dietary modification, exercise, and weight loss.   - avoid alcohol, drugs, tobacco products   - CBC WITH DIFFERENTIAL; Future  - Comp Metabolic Panel; Future  - Lipid Profile; Future    2. Vitamin D insufficiency  - 2000 units of vitamin  D daily   - VITAMIN D,25 HYDROXY; Future    3. Obesity (BMI 30-39.9)  - Patient identified as having weight management issue.  Appropriate orders and counseling given.    4. Encounter for screening mammogram for breast cancer  - MA-SCREENING MAMMO BILAT W/CAD; Future    5. Screening for colorectal cancer  - REFERRAL TO GI FOR COLONOSCOPY    6. Menopause  - DS-BONE DENSITY STUDY (DEXA); Future    7. Need for vaccination  - Shingles Vaccine (SHINGRIX)    8. PE (physical exam), annual  General health and wellness counseling provided.      Will schedule appointment for pap.   - CBC WITH DIFFERENTIAL; Future  - Comp Metabolic Panel; Future  - HEMOGLOBIN A1C; Future  - Lipid Profile; Future    9. Primary osteoarthritis of left knee  Hx and exam suggest left knee OA.   Symptoms do not bother pt very much.   Conservative treatment recommended and discussed.   Home rehab exercises provided  - DX-KNEE 3 VIEWS LEFT; Future      Glendy Rock, " M.D.      Followup: Return in about 3 months (around 10/9/2021) for Pap.    Please note that this dictation was created using voice recognition software. I have made every reasonable attempt to correct obvious errors, but I expect that there are errors of grammar and possibly content that I did not discover before finalizing the note.

## 2021-07-10 LAB
25(OH)D3 SERPL-MCNC: 28 NG/ML (ref 30–100)
ALBUMIN SERPL BCP-MCNC: 4.1 G/DL (ref 3.2–4.9)
ALBUMIN/GLOB SERPL: 1.5 G/DL
ALP SERPL-CCNC: 88 U/L (ref 30–99)
ALT SERPL-CCNC: 12 U/L (ref 2–50)
ANION GAP SERPL CALC-SCNC: 8 MMOL/L (ref 7–16)
AST SERPL-CCNC: 13 U/L (ref 12–45)
BASOPHILS # BLD AUTO: 0.9 % (ref 0–1.8)
BASOPHILS # BLD: 0.06 K/UL (ref 0–0.12)
BILIRUB SERPL-MCNC: 0.7 MG/DL (ref 0.1–1.5)
BUN SERPL-MCNC: 14 MG/DL (ref 8–22)
CALCIUM SERPL-MCNC: 9.1 MG/DL (ref 8.5–10.5)
CHLORIDE SERPL-SCNC: 104 MMOL/L (ref 96–112)
CHOLEST SERPL-MCNC: 235 MG/DL (ref 100–199)
CO2 SERPL-SCNC: 26 MMOL/L (ref 20–33)
CREAT SERPL-MCNC: 0.76 MG/DL (ref 0.5–1.4)
EOSINOPHIL # BLD AUTO: 0.15 K/UL (ref 0–0.51)
EOSINOPHIL NFR BLD: 2.3 % (ref 0–6.9)
ERYTHROCYTE [DISTWIDTH] IN BLOOD BY AUTOMATED COUNT: 41.5 FL (ref 35.9–50)
EST. AVERAGE GLUCOSE BLD GHB EST-MCNC: 117 MG/DL
FASTING STATUS PATIENT QL REPORTED: NORMAL
GLOBULIN SER CALC-MCNC: 2.8 G/DL (ref 1.9–3.5)
GLUCOSE SERPL-MCNC: 91 MG/DL (ref 65–99)
HBA1C MFR BLD: 5.7 % (ref 4–5.6)
HCT VFR BLD AUTO: 42.3 % (ref 37–47)
HDLC SERPL-MCNC: 51 MG/DL
HGB BLD-MCNC: 14 G/DL (ref 12–16)
IMM GRANULOCYTES # BLD AUTO: 0.02 K/UL (ref 0–0.11)
IMM GRANULOCYTES NFR BLD AUTO: 0.3 % (ref 0–0.9)
LDLC SERPL CALC-MCNC: 155 MG/DL
LYMPHOCYTES # BLD AUTO: 2.33 K/UL (ref 1–4.8)
LYMPHOCYTES NFR BLD: 35.2 % (ref 22–41)
MCH RBC QN AUTO: 30.9 PG (ref 27–33)
MCHC RBC AUTO-ENTMCNC: 33.1 G/DL (ref 33.6–35)
MCV RBC AUTO: 93.4 FL (ref 81.4–97.8)
MONOCYTES # BLD AUTO: 0.52 K/UL (ref 0–0.85)
MONOCYTES NFR BLD AUTO: 7.9 % (ref 0–13.4)
NEUTROPHILS # BLD AUTO: 3.53 K/UL (ref 2–7.15)
NEUTROPHILS NFR BLD: 53.4 % (ref 44–72)
NRBC # BLD AUTO: 0 K/UL
NRBC BLD-RTO: 0 /100 WBC
PLATELET # BLD AUTO: 286 K/UL (ref 164–446)
PMV BLD AUTO: 9.8 FL (ref 9–12.9)
POTASSIUM SERPL-SCNC: 3.8 MMOL/L (ref 3.6–5.5)
PROT SERPL-MCNC: 6.9 G/DL (ref 6–8.2)
RBC # BLD AUTO: 4.53 M/UL (ref 4.2–5.4)
SODIUM SERPL-SCNC: 138 MMOL/L (ref 135–145)
TRIGL SERPL-MCNC: 147 MG/DL (ref 0–149)
WBC # BLD AUTO: 6.6 K/UL (ref 4.8–10.8)

## 2021-08-02 ENCOUNTER — HOSPITAL ENCOUNTER (OUTPATIENT)
Dept: RADIOLOGY | Facility: MEDICAL CENTER | Age: 61
End: 2021-08-02
Attending: FAMILY MEDICINE
Payer: COMMERCIAL

## 2021-08-02 DIAGNOSIS — Z12.31 ENCOUNTER FOR SCREENING MAMMOGRAM FOR BREAST CANCER: ICD-10-CM

## 2021-08-02 PROCEDURE — 77063 BREAST TOMOSYNTHESIS BI: CPT

## 2021-09-07 ENCOUNTER — HOSPITAL ENCOUNTER (OUTPATIENT)
Dept: RADIOLOGY | Facility: MEDICAL CENTER | Age: 61
End: 2021-09-07
Attending: FAMILY MEDICINE
Payer: COMMERCIAL

## 2021-09-07 DIAGNOSIS — Z78.0 MENOPAUSE: ICD-10-CM

## 2021-09-07 PROCEDURE — 77080 DXA BONE DENSITY AXIAL: CPT

## 2021-09-09 PROBLEM — M85.89 OSTEOPENIA OF MULTIPLE SITES: Status: ACTIVE | Noted: 2021-09-09

## 2021-10-11 ENCOUNTER — HOSPITAL ENCOUNTER (OUTPATIENT)
Facility: MEDICAL CENTER | Age: 61
End: 2021-10-11
Attending: FAMILY MEDICINE
Payer: COMMERCIAL

## 2021-10-11 ENCOUNTER — OFFICE VISIT (OUTPATIENT)
Dept: MEDICAL GROUP | Age: 61
End: 2021-10-11
Payer: COMMERCIAL

## 2021-10-11 VITALS
DIASTOLIC BLOOD PRESSURE: 76 MMHG | HEIGHT: 68 IN | SYSTOLIC BLOOD PRESSURE: 124 MMHG | BODY MASS INDEX: 36.07 KG/M2 | OXYGEN SATURATION: 96 % | WEIGHT: 238 LBS | HEART RATE: 75 BPM | TEMPERATURE: 97.6 F

## 2021-10-11 DIAGNOSIS — E55.9 VITAMIN D INSUFFICIENCY: ICD-10-CM

## 2021-10-11 DIAGNOSIS — Z23 NEED FOR VACCINATION: ICD-10-CM

## 2021-10-11 DIAGNOSIS — Z11.51 SPECIAL SCREENING EXAMINATION FOR HUMAN PAPILLOMAVIRUS (HPV): ICD-10-CM

## 2021-10-11 DIAGNOSIS — Z12.4 ROUTINE CERVICAL SMEAR: ICD-10-CM

## 2021-10-11 DIAGNOSIS — E78.00 PURE HYPERCHOLESTEROLEMIA: ICD-10-CM

## 2021-10-11 DIAGNOSIS — Z01.419 WELL WOMAN EXAM: Primary | ICD-10-CM

## 2021-10-11 DIAGNOSIS — M85.89 OSTEOPENIA OF MULTIPLE SITES: ICD-10-CM

## 2021-10-11 DIAGNOSIS — Z01.419 ENCOUNTER FOR WELL WOMAN EXAM WITH ROUTINE GYNECOLOGICAL EXAM: ICD-10-CM

## 2021-10-11 DIAGNOSIS — R73.03 PREDIABETES: ICD-10-CM

## 2021-10-11 PROCEDURE — 87624 HPV HI-RISK TYP POOLED RSLT: CPT

## 2021-10-11 PROCEDURE — 99214 OFFICE O/P EST MOD 30 MIN: CPT | Mod: 25 | Performed by: FAMILY MEDICINE

## 2021-10-11 PROCEDURE — 88175 CYTOPATH C/V AUTO FLUID REDO: CPT

## 2021-10-11 PROCEDURE — 90686 IIV4 VACC NO PRSV 0.5 ML IM: CPT | Performed by: FAMILY MEDICINE

## 2021-10-11 PROCEDURE — 90471 IMMUNIZATION ADMIN: CPT | Performed by: FAMILY MEDICINE

## 2021-10-11 PROCEDURE — 99396 PREV VISIT EST AGE 40-64: CPT | Mod: 25 | Performed by: FAMILY MEDICINE

## 2021-10-11 RX ORDER — ROSUVASTATIN CALCIUM 20 MG/1
20 TABLET, COATED ORAL EVERY EVENING
Qty: 90 TABLET | Refills: 3 | Status: SHIPPED | OUTPATIENT
Start: 2021-10-11

## 2021-10-11 ASSESSMENT — FIBROSIS 4 INDEX: FIB4 SCORE: 0.83

## 2021-10-11 NOTE — PROGRESS NOTES
"Subjective:   CC: WWE, pap     HPI:     Luisa Cerda is a 61 y.o. female, established patient of the clinic.     , not sexually active,  for 5 years.   Contraception: post-menopause  Hx of STD: negative   Hx of abnormal pap: negative   Patient's last menstrual period was 2001.  Denies fever, chills, pelvic pain, dyspareunia, abnormal vaginal bleeding/discharge, genital rash.   Denies nipple bleeding, discharge, breast mass, familial/personal hx of breast/gyn malignancy.   Last mammogram: 2021, Finding: negative      Current medicines (including changes today)  Current Outpatient Medications   Medication Sig Dispense Refill   • rosuvastatin (CRESTOR) 20 MG Tab Take 1 Tablet by mouth every evening. 90 Tablet 3     No current facility-administered medications for this visit.     She  has no past medical history on file.    I personally reviewed patient's problem list, allergies, medications, family hx, social hx with patient and update EPIC.     REVIEW OF SYSTEMS:  CONSTITUTIONAL:  Denies night sweats, fatigue, malaise, lethargy, fever or chills.  RESPIRATORY:  Denies cough, wheeze, hemoptysis, or shortness of breath.  CARDIOVASCULAR:  Denies chest pains, palpitations, pedal edema     Objective:     /76 (BP Location: Right arm, Patient Position: Sitting, BP Cuff Size: Adult long)   Pulse 75   Temp 36.4 °C (97.6 °F) (Temporal)   Ht 1.727 m (5' 8\")   Wt 108 kg (238 lb)   SpO2 96%  Body mass index is 36.19 kg/m².    Physical Exam:  Constitutional: awake, alert, in no distress.  Skin: Warm, dry, good turgor, no rashes, bruises, ulcers in visible areas.  Eye: conjunctiva clear, lids neg for edema or lesions.  GYN: Unremarkable external genitalia. Negative abnormal vaginal discharge or bleeding, no vaginal rash, cervix in mid position, no concerning lesions on cervix, no cervical motion tenderness, uterus mid position with normal size & shape, no adnexal fullness/mass appreciated on bimanual " exam.   Psych: Oriented x3, affect and mood wnl, intact judgement and insight.       Assessment and Plan:   The following treatment plan was discussed    1. Need for vaccination  - INFLUENZA VACCINE QUAD INJ (PF)    2. Encounter for well woman exam with routine gynecological exam  - THINPREP PAP WITH HPV    3. Routine cervical smear  - THINPREP PAP WITH HPV    4. Special screening examination for human papillomavirus (HPV)  - THINPREP PAP WITH HPV    5. Well woman exam  General health and wellness counseling provided.      - Comp Metabolic Panel; Future  - VITAMIN D,25 HYDROXY; Future  - Lipid Profile; Future  - THINPREP PAP WITH HPV  - CBC WITH DIFFERENTIAL; Future    6. Pure hypercholesterolemia  Chronic, recent labs showed worsening lipid profile.   Patient is interested in pharmacotherapy to improve this condition.   - rosuvastatin (CRESTOR) 20 MG Tab; Take 1 Tablet by mouth every evening.  Dispense: 90 Tablet; Refill: 3  - dietary modification, exercise, and weight loss.   - avoid alcohol, drugs, tobacco products   - Comp Metabolic Panel; Future  - Lipid Profile; Future  - CBC WITH DIFFERENTIAL; Future    7. Prediabetes  A1C was 5.7 per most recent labs.   - Dietary/lifestyle modification and weight loss      8. Vitamin D insufficiency  Recent labs showed vitamin D insufficiency   - 4000 units of vitamin D daily.   - VITAMIN D,25 HYDROXY; Future    9. Osteopenia of multiple sites  - Calcium and Vitamin D  - Weight-bearing aerobic and strengthening exercises can decrease risk of falls and fractures by improving muscle strength, coordination, balance, and mobility  - Limit caffeine intake to 2 or fewer servings per day  - Limit alcohol consumption to one drink per day  - pt was counseled on fall risk prevention          Glendy Rock M.D.      Followup: Return in about 1 year (around 10/11/2022).    Please note that this dictation was created using voice recognition software. I have made every reasonable attempt to  correct obvious errors, but I expect that there are errors of grammar and possibly content that I did not discover before finalizing the note.

## 2021-10-12 LAB
CYTOLOGY REG CYTOL: NORMAL
HPV HR 12 DNA CVX QL NAA+PROBE: NEGATIVE
HPV16 DNA SPEC QL NAA+PROBE: NEGATIVE
HPV18 DNA SPEC QL NAA+PROBE: NEGATIVE
SPECIMEN SOURCE: NORMAL

## 2022-01-10 ENCOUNTER — OFFICE VISIT (OUTPATIENT)
Dept: MEDICAL GROUP | Age: 62
End: 2022-01-10
Payer: COMMERCIAL

## 2022-01-10 VITALS
HEIGHT: 68 IN | HEART RATE: 82 BPM | DIASTOLIC BLOOD PRESSURE: 80 MMHG | OXYGEN SATURATION: 100 % | SYSTOLIC BLOOD PRESSURE: 124 MMHG | WEIGHT: 241 LBS | TEMPERATURE: 98 F | BODY MASS INDEX: 36.53 KG/M2

## 2022-01-10 DIAGNOSIS — H93.13 TINNITUS OF BOTH EARS: ICD-10-CM

## 2022-01-10 PROCEDURE — 99213 OFFICE O/P EST LOW 20 MIN: CPT | Performed by: FAMILY MEDICINE

## 2022-01-10 ASSESSMENT — PATIENT HEALTH QUESTIONNAIRE - PHQ9: CLINICAL INTERPRETATION OF PHQ2 SCORE: 0

## 2022-01-10 ASSESSMENT — FIBROSIS 4 INDEX: FIB4 SCORE: 0.83

## 2022-01-11 NOTE — PROGRESS NOTES
"Subjective:   CC: tinnitus     HPI:     Luisa Cerda is a 61 y.o. female, established patient of the clinic.     Patient presents with chronic worsening ringing in both ear for approximately 1 year.  Patient report of high-pitched ringing that is not bothersome.  She is not sure if her hearing is reduced.  She denies symptoms of vertigo.  Negative fever, chills, history of ear trauma, history of constant exposure to loud sounds in her youth, visual changes, dysphagia, dysphonia, focal weakness, paresthesia, gait problems, history of head trauma.  Patient is otherwise feeling well.  She is concerned of possibility of having tumor in her ears.      Current medicines (including changes today)  Current Outpatient Medications   Medication Sig Dispense Refill   • rosuvastatin (CRESTOR) 20 MG Tab Take 1 Tablet by mouth every evening. 90 Tablet 3     No current facility-administered medications for this visit.     She  has no past medical history on file.    I reviewed patient's problem list, allergies, medications, family hx, social hx with patient and update EPIC.        Objective:     /80 (BP Location: Right arm, Patient Position: Sitting, BP Cuff Size: Adult long)   Pulse 82   Temp 36.7 °C (98 °F) (Temporal)   Ht 1.727 m (5' 8\")   Wt 109 kg (241 lb)   SpO2 100%  Body mass index is 36.64 kg/m².    Physical Exam:  Constitutional: awake, alert, in no distress.  Skin: Warm, dry, good turgor, no rashes, bruises, ulcers in visible areas.  Eye: conjunctiva clear, lids neg for edema or lesions.  ENMT: ear exam within normal limits bilaterally. Oral and nasal mucosa wnl. Lips without lesions, good dentition, oropharynx clear.  Neck: Trachea midline, no masses, no thyromegaly. No cervical or supraclavicular lymphadenopathy  Respiratory: Unlabored respiratory effort, lungs clear to auscultation, no wheezes, no rales.  Cardiovascular: Normal S1, S2, no murmur, no pedal edema.  Psych: Oriented x3, affect and mood wnl, " intact judgement and insight.       Assessment and Plan:   The following treatment plan was discussed    1. Tinnitus of both ears  Chronic worsening high-pitch tinnitus bilaterally. History and exam otherwise unremarkable.  Low concerns for intracranial pathology. Discussed pathogenesis and treatment options. Will refer patient to [facility] audiology for additional testing.   - Referral to Audiology      Glendy Rock M.D.      Followup: Return for As needed.    Please note that this dictation was created using voice recognition software. I have made every reasonable attempt to correct obvious errors, but I expect that there are errors of grammar and possibly content that I did not discover before finalizing the note.

## 2022-02-24 ENCOUNTER — OFFICE VISIT (OUTPATIENT)
Dept: AUDIOLOGY | Facility: OTHER | Age: 62
End: 2022-02-24
Payer: COMMERCIAL

## 2022-02-24 DIAGNOSIS — H90.3 SENSORINEURAL HEARING LOSS, BILATERAL: ICD-10-CM

## 2022-02-24 PROCEDURE — 92557 COMPREHENSIVE HEARING TEST: CPT | Performed by: AUDIOLOGIST

## 2022-02-24 PROCEDURE — 92567 TYMPANOMETRY: CPT | Performed by: AUDIOLOGIST

## 2022-02-25 NOTE — PROGRESS NOTES
Luisa Cerda was seen for a hearing evaluation due to a history a two year history of constant, bilateral tinnitus. 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  Otoscopy revealed a clear canal bilaterally. The TM was appeared intact and healthy.  Tympanometry found a normal Type A tymp, indicating normal middle ear pressure and tympanic mobility bilaterally.  Pure tone air and bone conduction thresholds found a bilateral, symmetrical, essenitally mild to moderate, sensori-neural hearing loss in the mid to high frequencies.  Speech reception was 20 dB HL in the right ear and  30 dB HL in the left ear.   Word recognition was  96% at 55 dB HL in the right ear and  96% at 60 dB HL in the left ear.     REC:  Binaural amplification to improve communication.   Discussed tinnitus strategies.  Given audiogram and discussed three of the closest audiology and hearing aid clinics.  Annual audio to monitor hearing loss.

## 2023-06-20 ENCOUNTER — OFFICE VISIT (OUTPATIENT)
Dept: URGENT CARE | Facility: PHYSICIAN GROUP | Age: 63
End: 2023-06-20
Payer: COMMERCIAL

## 2023-06-20 VITALS
HEART RATE: 112 BPM | HEIGHT: 67 IN | OXYGEN SATURATION: 96 % | RESPIRATION RATE: 16 BRPM | DIASTOLIC BLOOD PRESSURE: 80 MMHG | SYSTOLIC BLOOD PRESSURE: 124 MMHG | WEIGHT: 257 LBS | TEMPERATURE: 97.8 F | BODY MASS INDEX: 40.34 KG/M2

## 2023-06-20 DIAGNOSIS — R06.2 WHEEZING: ICD-10-CM

## 2023-06-20 DIAGNOSIS — R06.02 SOB (SHORTNESS OF BREATH): ICD-10-CM

## 2023-06-20 DIAGNOSIS — J30.2 SEASONAL ALLERGIES: ICD-10-CM

## 2023-06-20 DIAGNOSIS — J45.20 MILD INTERMITTENT REACTIVE AIRWAY DISEASE WITHOUT COMPLICATION: ICD-10-CM

## 2023-06-20 PROCEDURE — 3079F DIAST BP 80-89 MM HG: CPT | Performed by: NURSE PRACTITIONER

## 2023-06-20 PROCEDURE — 3074F SYST BP LT 130 MM HG: CPT | Performed by: NURSE PRACTITIONER

## 2023-06-20 PROCEDURE — 94664 DEMO&/EVAL PT USE INHALER: CPT | Mod: 59 | Performed by: NURSE PRACTITIONER

## 2023-06-20 PROCEDURE — 99214 OFFICE O/P EST MOD 30 MIN: CPT | Mod: 25 | Performed by: NURSE PRACTITIONER

## 2023-06-20 PROCEDURE — 94640 AIRWAY INHALATION TREATMENT: CPT | Performed by: NURSE PRACTITIONER

## 2023-06-20 PROCEDURE — 94760 N-INVAS EAR/PLS OXIMETRY 1: CPT | Performed by: NURSE PRACTITIONER

## 2023-06-20 RX ORDER — ALBUTEROL SULFATE 90 UG/1
1-2 AEROSOL, METERED RESPIRATORY (INHALATION) EVERY 6 HOURS PRN
Qty: 8.5 G | Refills: 0 | Status: SHIPPED | OUTPATIENT
Start: 2023-06-20

## 2023-06-20 RX ORDER — ALBUTEROL SULFATE 2.5 MG/3ML
2.5 SOLUTION RESPIRATORY (INHALATION) ONCE
Status: COMPLETED | OUTPATIENT
Start: 2023-06-20 | End: 2023-06-20

## 2023-06-20 RX ADMIN — ALBUTEROL SULFATE 2.5 MG: 2.5 SOLUTION RESPIRATORY (INHALATION) at 15:54

## 2023-06-20 ASSESSMENT — FIBROSIS 4 INDEX: FIB4 SCORE: 0.84

## 2023-06-20 NOTE — PROGRESS NOTES
Luisa Cerda is a 62 y.o. female who presents for Shortness of Breath (X 1.5 weeks), Cough, Wheezing (X 1.5 weeks), and Congestion (Chest congestion )      HPI  This is a new problem. Luisa Cerda is a 62 y.o. patient who presents to urgent care with c/o: sob for 1.5 weeks. Feeling very fatigued. Congestion in chest, wheezing sometimes when she breaths out hard. Hx of allergies.   Treatments tried: nothing   Denies fever, orthopnea, c/p, dizziness, headache, sinus pain, ear pain, sore throat.    No other aggravating or alleviating factors.       ROS See HPI    Allergies:       Allergies   Allergen Reactions    Seasonal      [place]       PMSFS Hx:  No past medical history on file.  No past surgical history on file.  Family History   Problem Relation Age of Onset    Hyperlipidemia Mother     Cancer Father 40        colon cancer    Lupus Sister     Diabetes Sister     Hypertension Sister     Hypertension Brother     No Known Problems Maternal Grandmother     Heart Attack Maternal Grandfather 80    Cancer Paternal Grandmother 40        colon cancer    Alcohol abuse Paternal Grandfather         cyrossis of the liver    No Known Problems Brother     Cancer Paternal Uncle 40        colon cancer     Social History     Tobacco Use    Smoking status: Never    Smokeless tobacco: Never   Vaping Use    Vaping Use: Never used   Substance Use Topics    Alcohol use: No       Problems:   Patient Active Problem List   Diagnosis    Obesity (BMI 30-39.9)    FHx: colon cancer    Vitamin D insufficiency    Pure hypercholesterolemia    Primary osteoarthritis of left knee    Osteopenia of multiple sites    Prediabetes       Medications:   Current Outpatient Medications on File Prior to Visit   Medication Sig Dispense Refill    rosuvastatin (CRESTOR) 20 MG Tab Take 1 Tablet by mouth every evening. 90 Tablet 3     No current facility-administered medications on file prior to visit.          Objective:     /80   Pulse  "(!) 112   Temp 36.6 °C (97.8 °F) (Temporal)   Resp 16   Ht 1.702 m (5' 7\")   Wt 117 kg (257 lb)   LMP 04/11/2001   SpO2 96%   BMI 40.25 kg/m²     Physical Exam  Vitals and nursing note reviewed.   Constitutional:       General: She is not in acute distress.     Appearance: Normal appearance. She is well-developed. She is not ill-appearing or toxic-appearing.   HENT:      Head: Normocephalic.      Right Ear: Hearing normal. No middle ear effusion. Tympanic membrane is injected. Tympanic membrane is not erythematous.      Left Ear: Hearing normal.  No middle ear effusion. Tympanic membrane is injected. Tympanic membrane is not erythematous.      Nose: No mucosal edema or rhinorrhea.      Right Sinus: No maxillary sinus tenderness or frontal sinus tenderness.      Left Sinus: No maxillary sinus tenderness or frontal sinus tenderness.      Mouth/Throat:      Pharynx: Uvula midline. No posterior oropharyngeal erythema.      Tonsils: No tonsillar abscesses.   Neck:      Trachea: Trachea normal.   Cardiovascular:      Rate and Rhythm: Normal rate and regular rhythm.      Chest Wall: PMI is not displaced.      Pulses: Normal pulses.      Heart sounds: Normal heart sounds.   Pulmonary:      Effort: Pulmonary effort is normal. No accessory muscle usage or respiratory distress.      Breath sounds: Decreased air movement present. Decreased breath sounds and wheezing present. No rhonchi or rales.      Comments: Demonstration &/or evaluation of pt utilization of a nebulizer and evaluation of results. Pt tolerated well. No adverse events. SpO2 post treatment 98%. Pt reports improved WOB. Resolved tightness sensation in her chest.   Auscultation: improved Vt and complete resolve of wheezing.      Musculoskeletal:         General: Normal range of motion.      Cervical back: Full passive range of motion without pain, normal range of motion and neck supple.   Lymphadenopathy:      Cervical: No cervical adenopathy.      Upper " Body:      Right upper body: No supraclavicular adenopathy.      Left upper body: No supraclavicular adenopathy.   Skin:     General: Skin is warm and dry.      Capillary Refill: Capillary refill takes less than 2 seconds.   Neurological:      Mental Status: She is alert and oriented to person, place, and time.      Gait: Gait normal.   Psychiatric:         Mood and Affect: Mood normal.         Speech: Speech normal.         Behavior: Behavior normal. Behavior is cooperative.           Assessment /Associated Orders:      1. Wheezing  albuterol (PROVENTIL) 2.5mg/3ml nebulizer solution 2.5 mg    albuterol 108 (90 Base) MCG/ACT Aero Soln inhalation aerosol      2. SOB (shortness of breath)  albuterol (PROVENTIL) 2.5mg/3ml nebulizer solution 2.5 mg    albuterol 108 (90 Base) MCG/ACT Aero Soln inhalation aerosol      3. Mild intermittent reactive airway disease without complication        4. Seasonal allergies              Medical Decision Making:    Pt is clinically stable at today's acute urgent care visit.  No acute distress noted. Appropriate for outpatient care at this time.   Acute problem today with uncertain prognosis.   OTC antihistamine of choice. Follow manufactures dosing and safety guidelines.   Keep well hydrated  Educated in proper administration of  prescription medication(s) ordered today including safety, possible SE, risks, benefits, rationale and alternatives to therapy.   Demonstrated use of MDI   Cool mist humidifier at night prn     Declines CXR today.     Discussed Dx, management options (risks,benefits, and alternatives to planned treatment), natural progression and supportive care.  Expressed understanding and the treatment plan was agreed upon.   Questions were encouraged and answered   Return to urgent care prn if new or worsening sx or if there is no improvement in condition prn.    Educated in Red flags and indications to immediately call 911 or present to the Emergency Department.        Time I spent evaluating Luisa Cerda in urgent care today was 30  minutes. This time includes preparing for visit, reviewing any pertinent notes or test results, counseling/education, exam, obtaining HPI, interpretation of lab tests, medication management and documentation as indicated above.Time does not include separately billable procedures noted .       Please note that this dictation was created using voice recognition software. I have worked with consultants from the vendor as well as technical experts from [facility] to optimize the interface. I have made every reasonable attempt to correct obvious errors, but I expect that there are errors of grammar and possibly content that I did not discover before finalizing the note.  This note was electronically signed by provider